# Patient Record
Sex: MALE | Race: WHITE | NOT HISPANIC OR LATINO
[De-identification: names, ages, dates, MRNs, and addresses within clinical notes are randomized per-mention and may not be internally consistent; named-entity substitution may affect disease eponyms.]

---

## 2023-11-14 PROBLEM — Z00.00 ENCOUNTER FOR PREVENTIVE HEALTH EXAMINATION: Status: ACTIVE | Noted: 2023-11-14

## 2023-11-17 ENCOUNTER — APPOINTMENT (OUTPATIENT)
Dept: CARDIOLOGY | Facility: CLINIC | Age: 53
End: 2023-11-17
Payer: COMMERCIAL

## 2023-11-17 VITALS
DIASTOLIC BLOOD PRESSURE: 84 MMHG | HEIGHT: 70 IN | OXYGEN SATURATION: 97 % | HEART RATE: 84 BPM | BODY MASS INDEX: 26.92 KG/M2 | WEIGHT: 188 LBS | SYSTOLIC BLOOD PRESSURE: 140 MMHG

## 2023-11-17 DIAGNOSIS — Z78.9 OTHER SPECIFIED HEALTH STATUS: ICD-10-CM

## 2023-11-17 PROCEDURE — 93242 EXT ECG>48HR<7D RECORDING: CPT

## 2023-11-17 PROCEDURE — 93000 ELECTROCARDIOGRAM COMPLETE: CPT | Mod: 59

## 2023-11-17 PROCEDURE — 99204 OFFICE O/P NEW MOD 45 MIN: CPT | Mod: 25

## 2023-11-21 PROBLEM — Z78.9 DOES NOT USE ILLICIT DRUGS: Status: ACTIVE | Noted: 2023-11-21

## 2023-11-21 RX ORDER — ATORVASTATIN CALCIUM 10 MG/1
10 TABLET, FILM COATED ORAL
Qty: 30 | Refills: 0 | Status: ACTIVE | COMMUNITY
Start: 2023-10-03

## 2023-11-28 ENCOUNTER — APPOINTMENT (OUTPATIENT)
Dept: CARDIOLOGY | Facility: CLINIC | Age: 53
End: 2023-11-28
Payer: COMMERCIAL

## 2023-11-28 PROCEDURE — 93306 TTE W/DOPPLER COMPLETE: CPT

## 2023-12-20 ENCOUNTER — APPOINTMENT (OUTPATIENT)
Dept: CARDIOLOGY | Facility: CLINIC | Age: 53
End: 2023-12-20
Payer: COMMERCIAL

## 2023-12-20 VITALS
BODY MASS INDEX: 27.92 KG/M2 | DIASTOLIC BLOOD PRESSURE: 78 MMHG | HEIGHT: 70 IN | HEART RATE: 63 BPM | WEIGHT: 195 LBS | SYSTOLIC BLOOD PRESSURE: 126 MMHG

## 2023-12-20 DIAGNOSIS — E78.5 HYPERLIPIDEMIA, UNSPECIFIED: ICD-10-CM

## 2023-12-20 DIAGNOSIS — R00.2 PALPITATIONS: ICD-10-CM

## 2023-12-20 PROCEDURE — 93000 ELECTROCARDIOGRAM COMPLETE: CPT

## 2023-12-20 PROCEDURE — 99213 OFFICE O/P EST LOW 20 MIN: CPT | Mod: 25

## 2023-12-20 RX ORDER — MULTIVIT-MIN/FOLIC/VIT K/LYCOP 400-300MCG
50 MCG TABLET ORAL
Qty: 90 | Refills: 0 | Status: DISCONTINUED | COMMUNITY
Start: 2023-08-14 | End: 2023-12-20

## 2023-12-20 RX ORDER — AZITHROMYCIN 250 MG/1
250 TABLET, FILM COATED ORAL
Qty: 6 | Refills: 0 | Status: DISCONTINUED | COMMUNITY
Start: 2023-07-14 | End: 2023-12-20

## 2023-12-20 RX ORDER — PREDNISOLONE ACETATE 10 MG/ML
1 SUSPENSION/ DROPS OPHTHALMIC
Qty: 5 | Refills: 0 | Status: DISCONTINUED | COMMUNITY
Start: 2023-10-02 | End: 2023-12-20

## 2023-12-20 RX ORDER — AZELASTINE HYDROCHLORIDE 137 UG/1
0.1 SPRAY, METERED NASAL
Qty: 30 | Refills: 0 | Status: DISCONTINUED | COMMUNITY
Start: 2023-10-03 | End: 2023-12-20

## 2023-12-20 RX ORDER — CIPROFLOXACIN 3 MG/ML
0.3 SOLUTION OPHTHALMIC
Qty: 5 | Refills: 0 | Status: DISCONTINUED | COMMUNITY
Start: 2023-10-02 | End: 2023-12-20

## 2023-12-20 RX ORDER — PROMETHAZINE HYDROCHLORIDE AND DEXTROMETHORPHAN HYDROBROMIDE ORAL SOLUTION 15; 6.25 MG/5ML; MG/5ML
6.25-15 SOLUTION ORAL
Qty: 100 | Refills: 0 | Status: DISCONTINUED | COMMUNITY
Start: 2023-07-14 | End: 2023-12-20

## 2023-12-26 PROBLEM — E78.5 DYSLIPIDEMIA: Status: ACTIVE | Noted: 2023-11-21

## 2023-12-26 PROBLEM — R00.2 PALPITATIONS: Status: ACTIVE | Noted: 2023-11-21

## 2023-12-26 NOTE — ASSESSMENT
[FreeTextEntry1] : Mr. Solis is a 53-year-old man with history of dyslipidemia presenting for evaluation of palpitations.  Impression: (1) Palpitations, event monitor with rare brief SVT and PACs (2) Abnormal ECG: LVH and LAD, TTE grossly unremarkable (3) Dyslipidemia, recently started statin therapy  Plan: - TTE and Event monitor results discussed in detail. - Repeat cardiometabolic labs >6 weeks post statin initiation  Annual follow up.

## 2023-12-26 NOTE — HISTORY OF PRESENT ILLNESS
[FreeTextEntry1] : Mr. Solis is a 53-year-old man with history of dyslipidemia presenting for evaluation of palpitations.  ECG shows NSR, left axis deviation, borderline criteria for LVH. No overt chest pain, dyspnea on exertion, LE swelling, PND, or orthopnea.  TTE 11/2023 - Normal biventricular function, no significant valvular disease  Event Monitor 12/2023 - 3 days - No AF, rare brief SVT, two events -> possible PACs  Labs: - , , HDL 52, , non- - K 4.2, Cr 0.92, normla transaminases - A1c 5.6% - TSH 1.2

## 2024-03-19 ENCOUNTER — APPOINTMENT (OUTPATIENT)
Dept: CARDIOLOGY | Facility: CLINIC | Age: 54
End: 2024-03-19

## 2025-01-03 ENCOUNTER — APPOINTMENT (OUTPATIENT)
Dept: CARDIOLOGY | Facility: CLINIC | Age: 55
End: 2025-01-03